# Patient Record
Sex: FEMALE | Race: WHITE
[De-identification: names, ages, dates, MRNs, and addresses within clinical notes are randomized per-mention and may not be internally consistent; named-entity substitution may affect disease eponyms.]

---

## 2021-07-07 NOTE — HISTORY AND PHYSICAL
DATE OF SERVICE:  



DATE OF ADMISSION:

07/14/2021.



ATTENDING PRIMARY CLINICIAN:

REANNA De León



HISTORY OF PRESENT ILLNESS:

The patient is a 61-year-old female with multiple gastrointestinal symptoms. 

She reports that she has had epigastric pain and reflux for many years.  She

also reports a history of having what she states is ulcers.  She is currently

not on any acid reduction medications.  She states that she also has developed

abdominal bloating and distention as well as nausea and vomiting usually after

eating meals, especially ones that are high in fat.  She has not had a

colonoscopy up to this point in her life as well.  She also reports that spicy

foods as well as acidic foods including red sauces make her epigastric pain

worse as well.



PAST MEDICAL HISTORY:

Peptic ulcer disease, hypercholesterolemia, hypothyroid, psoriatic arthritis,

Hashimoto's thyroiditis.



PAST SURGICAL HISTORY:

Hysterectomy in 1983, total hysterectomy in 1994, total thyroidectomy in 2019.



ALLERGIES:

PHENOBARBITAL.



MEDICATIONS:

Levothyroxine, atorvastatin.



SOCIAL HISTORY:

Positive smoke 40-pack years.  Negative alcohol.



FAMILY HISTORY:

Sister with stroke in her early 40s.



VITAL SIGNS:  Blood pressure 120/80, current weight 122.1 pounds, height 4 feet

11 inches.



REVIEW OF SYSTEMS:

Well-nourished female, in no acute distress.  She is not experiencing any

shortness of breath or difficulty breathing.  No chest pain, palpitations,

diaphoresis.  Intermittent episodes of epigastric pain, abdominal bloating as

well as intermittent nausea and vomiting.  No hematemesis, no coffee ground

emesis.  She also has intermittent episodes of diarrhea, usually after meals. 

No red blood per rectum, no dark tarry stools.  No fever, chills, no recent

inadvertent weight loss.



PHYSICAL EXAMINATION:

CHEST:  Clear.  Good breath sounds bilaterally.

HEART:  Regular, no murmurs.

EXTREMITIES:  No lower extremity edema, negative Homans sign.

HEENT:  No scleral icterus.

NECK:  No cervical lymphadenopathy.

ABDOMEN:  Soft, nondistended.  There is pain in the epigastric region on deep

palpation as well as a right upper abdominal quadrant.  No peritoneal signs.  No

hernias.

SKIN:  Warm, dry.



ASSESSMENT AND PLAN:

A 61-year-old female with a reflux esophagitis, history of peptic ulcer disease,

intermittent episodes of diarrhea as well as abdominal bloating, distention and

nausea and vomiting following meals.  She likely has some component of reflux

esophagitis as well as gastritis and we will schedule an EGD.  However, she is

also in need of a colonoscopy for she has not had one up to this point in her

life.  We will also proceed with a workup of her gallbladder with an ultrasound

and if this is negative, we will then proceed with a HIDA scan to evaluate for

the possibility of symptomatic biliary dyskinesia.





Job ID: [id number]

DocumentID: [id number]

Dictated Date:  07/06/2021 17:15:49

Transcription Date: 07/06/2021 17:39:20

Dictated By: KLEVER SALGADO MD

## 2021-07-09 ENCOUNTER — HOSPITAL ENCOUNTER (OUTPATIENT)
Dept: HOSPITAL 75 - RAD | Age: 61
End: 2021-07-09
Attending: SURGERY
Payer: COMMERCIAL

## 2021-07-09 DIAGNOSIS — R10.11: Primary | ICD-10-CM

## 2021-07-09 DIAGNOSIS — R10.13: ICD-10-CM

## 2021-07-09 DIAGNOSIS — R11.2: ICD-10-CM

## 2021-07-09 PROCEDURE — 76705 ECHO EXAM OF ABDOMEN: CPT

## 2021-07-09 NOTE — DIAGNOSTIC IMAGING REPORT
PROCEDURE: US Gallbladder.



TECHNIQUE: Multiple real-time grayscale images were obtained over

the right upper quadrant in various projections.



INDICATION:  Epigastric pain, nausea and vomiting.



FINDINGS: The liver is normal in size without focal lesions. No

biliary duct dilatation.  Common bile duct measures 3 mm. There

is no cholelithiasis, gallbladder wall thickening or

pericholecystic fluid. Pancreas is unremarkable. Aorta is

nonaneurysmal. IVC is patent. Right kidney is normal. No ascites.



IMPRESSION: Unremarkable right quadrant ultrasound.



Dictated by: 



  Dictated on workstation # [id number]

## 2021-07-12 ENCOUNTER — HOSPITAL ENCOUNTER (OUTPATIENT)
Dept: HOSPITAL 75 - PREOP | Age: 61
Discharge: HOME | End: 2021-07-12
Attending: SURGERY
Payer: COMMERCIAL

## 2021-07-12 VITALS — WEIGHT: 122.14 LBS | HEIGHT: 59.02 IN | BODY MASS INDEX: 24.62 KG/M2

## 2021-07-12 DIAGNOSIS — Z20.822: ICD-10-CM

## 2021-07-12 DIAGNOSIS — K21.9: ICD-10-CM

## 2021-07-12 DIAGNOSIS — Z12.11: ICD-10-CM

## 2021-07-12 DIAGNOSIS — Z01.812: Primary | ICD-10-CM

## 2021-07-12 PROCEDURE — 87635 SARS-COV-2 COVID-19 AMP PRB: CPT

## 2021-07-14 ENCOUNTER — HOSPITAL ENCOUNTER (OUTPATIENT)
Dept: HOSPITAL 75 - ENDO | Age: 61
End: 2021-07-14
Attending: SURGERY
Payer: COMMERCIAL

## 2021-07-14 VITALS — SYSTOLIC BLOOD PRESSURE: 139 MMHG | DIASTOLIC BLOOD PRESSURE: 73 MMHG

## 2021-07-14 VITALS — SYSTOLIC BLOOD PRESSURE: 114 MMHG | DIASTOLIC BLOOD PRESSURE: 55 MMHG

## 2021-07-14 VITALS — DIASTOLIC BLOOD PRESSURE: 67 MMHG | SYSTOLIC BLOOD PRESSURE: 148 MMHG

## 2021-07-14 VITALS — SYSTOLIC BLOOD PRESSURE: 143 MMHG | DIASTOLIC BLOOD PRESSURE: 81 MMHG

## 2021-07-14 VITALS — HEIGHT: 59.02 IN | BODY MASS INDEX: 24.62 KG/M2 | WEIGHT: 122.14 LBS

## 2021-07-14 VITALS — SYSTOLIC BLOOD PRESSURE: 123 MMHG | DIASTOLIC BLOOD PRESSURE: 56 MMHG

## 2021-07-14 VITALS — DIASTOLIC BLOOD PRESSURE: 66 MMHG | SYSTOLIC BLOOD PRESSURE: 138 MMHG

## 2021-07-14 VITALS — SYSTOLIC BLOOD PRESSURE: 91 MMHG | DIASTOLIC BLOOD PRESSURE: 54 MMHG

## 2021-07-14 VITALS — SYSTOLIC BLOOD PRESSURE: 97 MMHG | DIASTOLIC BLOOD PRESSURE: 52 MMHG

## 2021-07-14 VITALS — DIASTOLIC BLOOD PRESSURE: 53 MMHG | SYSTOLIC BLOOD PRESSURE: 89 MMHG

## 2021-07-14 VITALS — DIASTOLIC BLOOD PRESSURE: 79 MMHG | SYSTOLIC BLOOD PRESSURE: 158 MMHG

## 2021-07-14 VITALS — SYSTOLIC BLOOD PRESSURE: 105 MMHG | DIASTOLIC BLOOD PRESSURE: 70 MMHG

## 2021-07-14 VITALS — SYSTOLIC BLOOD PRESSURE: 175 MMHG | DIASTOLIC BLOOD PRESSURE: 78 MMHG

## 2021-07-14 DIAGNOSIS — K64.1: ICD-10-CM

## 2021-07-14 DIAGNOSIS — Z79.890: ICD-10-CM

## 2021-07-14 DIAGNOSIS — Z87.11: ICD-10-CM

## 2021-07-14 DIAGNOSIS — Z79.899: ICD-10-CM

## 2021-07-14 DIAGNOSIS — L40.50: ICD-10-CM

## 2021-07-14 DIAGNOSIS — E78.00: ICD-10-CM

## 2021-07-14 DIAGNOSIS — K29.70: ICD-10-CM

## 2021-07-14 DIAGNOSIS — E03.9: ICD-10-CM

## 2021-07-14 DIAGNOSIS — K21.00: ICD-10-CM

## 2021-07-14 DIAGNOSIS — Z12.11: Primary | ICD-10-CM

## 2021-07-14 DIAGNOSIS — E06.3: ICD-10-CM

## 2021-07-14 DIAGNOSIS — K44.9: ICD-10-CM

## 2021-07-14 PROCEDURE — 88305 TISSUE EXAM BY PATHOLOGIST: CPT

## 2021-07-14 NOTE — PROGRESS NOTE-POST OPERATIVE
Post-Operative Progess Note


Surgeon (s)/Assistant (s)


Surgeon


KLEVER SALGADO MD


Assistant:  none





Pre-Operative Diagnosis


GERD, screening colo





Post-Operative Diagnosis





reflux esophagitis(stage 2), small HH(2cm), moderate gastritis. 


mild chronic stage 2 ext and int hemorrhoids.





Procedure & Operative Findings


Date of Procedure


7/14/21


Procedure Performed/Findings


EGD with bx. 


colonoscopy


Anesthesia Type


cs





Estimated Blood Loss


Estimated blood loss (mL):  minimal





Specimens/Packing


Specimens Removed


ge jxn, antrum











KLEVER SALGADO MD                Jul 14, 2021 12:01

## 2021-07-14 NOTE — PROGRESS NOTE-PRE OPERATIVE
Pre-Operative Progress Note


H&P Reviewed


The H&P was reviewed, patient examined and no changes noted.


Date Seen by Provider:  Jul 14, 2021


Time Seen by Provider:  10:00


Date H&P Reviewed:  Jul 14, 2021


Time H&P Reviewed:  10:00


Pre-Operative Diagnosis:  GERD, screening colo











KLEVER SALGADO MD                Jul 14, 2021 10:15

## 2021-07-14 NOTE — DISCHARGE INST-SURGICAL
D/C Lap Instructions-KIDO


New, Converted, or Re-Newed RX:  RX on Chart


Follow Up 





Activity as tolerated





High Fiber Diet 25g or more per day





Avoid Alcohol, Caffeine, Spicy Greasy and Acid foods.





Drink 64 fluid oz or more of fluids per day.





Symptoms to Report: Fever over 101 degree F, Nausea/Vomiting 


If any problems/questions: Contact your physician or go to Emergency Room











KLEVER SALGADO MD                Jul 14, 2021 10:17

## 2021-07-14 NOTE — OPERATIVE REPORT
DATE OF SERVICE:  07/14/2021



ATTENDING PRIMARY CARE CLINICIAN:

REANNA De León



PREOPERATIVE DIAGNOSES:

Gastroesophageal reflux disease, abdominal bloating and distention, screening

colonoscopy.



POSTOPERATIVE DIAGNOSES:

Reflux esophagitis stage II, small hiatal hernia approximately 2 cm in size,

moderate gastritis.  Chronic stage II external and internal hemorrhoids. 

Remainder of the colon and rectum were normal.



PROCEDURE:

EGD with biopsy, colonoscopy.



SURGEON:

Klever Salgado MD



ANESTHESIA:

Conscious sedation.



ESTIMATED BLOOD LOSS:

Minimal.



FINDINGS:

Reflux esophagitis stage II, small hiatal hernia approximately 2 cm in size,

moderate gastritis.  Chronic stage II external and internal hemorrhoids. 

Remainder of the colon and rectum were normal.



DISPOSITION:

The patient tolerated the procedure well.



INDICATIONS:

The patient is a 61-year-old female who has had multiple gastrointestinal

symptoms.  She reports epigastric pain and reflux for many years; however, this

has worsened and she also develops that she has had gastric ulcers in the past. 

She is currently not on any acid reducers.  She also has developed abdominal

bloating, distention as well as nausea and vomiting usually after meals,

especially ones that are high in fat content.  She has also not had a

colonoscopy up to this point in her life.  She does not report any red blood per

rectum nor any dark tarry stools as well as no family history of colon cancer.



DESCRIPTION OF PROCEDURE:

The patient was brought to the endoscopy suite, laid in the left lateral

decubitus position with head slightly elevated.  After adequate IV pain,

anesthetic medications and conscious sedation anesthesia, the mouthpiece was

applied.



The endoscope was placed in the mouth, visualized the pharynx and hypopharyngeal

region.  Vocal cords, epiglottis and vallecula identified and appeared to be

normal.  The endoscope was then gently intubated.  The esophageal opening and

esophagus insufflated.  The endoscope was then advanced through the first,

second and third portion of esophagus at the level of the GE junction, reflux

esophagitis stage II identified.  There were no ulcers or strictures identified

in this region.  A biopsy was taken of the GE junction with forceps with

visualization of good hemostasis.



The endoscope was then advanced in the stomach and endoscope retroflexed,

visualizing a small hiatal hernia approximately 2 cm in size.  There was a

moderate severity gastritis more towards the stomach antrum.  No formal

ulcerations, polyps, or any neoplasms.  A biopsy was taken of the antrum to rule

out H. pylori with visualization of good hemostasis.  The endoscope was then

advanced to the pylorus and the first and second portion of the duodenum, which

appeared normal.  No distal obstructions.  The endoscope was then slowly

withdrawn while taking a second look and suctioning of residual air with no

additional findings.



A digital rectal examination was performed, which revealed chronic stage II

external and internal hemorrhoids, not actively edematous nor inflamed and no

bleeding.  Normal sphincter tone was felt and there were no palpable masses.



The endoscope was then intubated and anus and rectum gently insufflated.  The

endoscope was then advanced to the valves of Gonzalez of the rectum with no

polyps or any neoplasms identified.  We then proceeded through the sigmoid colon

where no diverticulosis identified.  We then advanced through the descending,

transverse and ascending colon to the cecum, which were normal.  There were no

polyps or any neoplasms identified throughout the colon or rectum.  The

endoscope was then slowly withdrawn while taking a second look and suctioning of

residual air with no additional findings.



The patient tolerated the procedure well.  For her reflux esophagitis, hiatal

hernia and gastritis, we will start her on Protonix 40 mg daily.  She will also

need to proceed with the necessary lifestyle and diet accommodation including

smoking cessation as well as avoidance of caffeinated beverages, spicy, greasy

and acidic foods.  We will also start her on Protonix 40 mg daily.  We will also

recommend a high fiber diet with at least 25 grams daily to promote soft stools

on a daily basis.  However, if she is asymptomatic from a lower gastrointestinal

standpoint, she does not need another colonoscopy for another 10 years.  We feel

that she potentially could have a gallbladder issue and we will have her follow

up in the office in 2 weeks to schedule an outpatient ultrasound as well as a

possible HIDA scan for a gallbladder etiology.





Job ID: [id number]

DocumentID: [id number]

Dictated Date:  07/14/2021 11:50:56

Transcription Date: 07/14/2021 18:22:39

Dictated By: KLEVER SALGADO MD

## 2021-07-14 NOTE — CONSCIOUS SEDATION/ASA
Conscious Sedation Pre-Proced


Time


10:00





ASA Score


2


For ASA 3 and 4: Consider anesthesia and medical clearance. Also, for patients

with a history of failed moderate sedation consider anesthesia.

















Airway 


 


Lungs 


 


Heart 


 


 ASA score


 


 ASA 1: a normal healthy patient


 


 ASA 2:  a patient with a mild systemic disease (mid diabetes, controlled 

hypertension, obesity 


 


 ASA 3:  a patient with a severe systemic disease that limits activity  (angina,

COPD, prior Myocardial infarction)


 


 ASA 4:  a patient with an incapacitating disease that is a constant threat to 

life (CHF, renal failure)


 


 ASA 5:  a moribund patient not expected to survive 24 hrs.  (ruptured aneurysm)


 


 ASA 6:  a declared brain-dead patient whose organs are being harvested.


 


 For emergent operations, add the letter E after the classification











Mallampati Classification


Grade 2





Sedation Plan


Analgesia, Amnesia, Plan communicated to team members, Discussed options with 

patient/fam, Discussed risks with patient/fam


The patient is an appropriate candidate to undergo the planned procedure, 

sedation, and anesthesia.





The patient immediately re-assessed prior to indication.











KLEVER SALGADO MD                Jul 14, 2021 10:15

## 2021-08-19 ENCOUNTER — HOSPITAL ENCOUNTER (OUTPATIENT)
Dept: HOSPITAL 75 - CARD | Age: 61
End: 2021-08-19
Attending: SURGERY
Payer: COMMERCIAL

## 2021-08-19 DIAGNOSIS — R11.2: ICD-10-CM

## 2021-08-19 DIAGNOSIS — R10.11: Primary | ICD-10-CM

## 2021-08-19 PROCEDURE — 78227 HEPATOBIL SYST IMAGE W/DRUG: CPT

## 2021-08-19 NOTE — DIAGNOSTIC IMAGING REPORT
INDICATION: Right upper quadrant pain.



The patient was administered 4.0 mCi technetium 99m Choletec

intravenously and imaging over the abdomen was performed. After

60 minutes, the patient ingested Ensure and a gallbladder

ejection fraction was calculated.



There is homogeneous uptake of activity by the liver with prompt

excretion of activity into the gallbladder and common duct. There

is normal passage of activity into the small bowel. Gallbladder

ejection fraction is 88%.



IMPRESSION: Normal HIDA scan and gallbladder ejection fraction.



Dictated by: 



  Dictated on workstation # [id number]

## 2021-08-26 ENCOUNTER — HOSPITAL ENCOUNTER (OUTPATIENT)
Dept: HOSPITAL 75 - PREOP | Age: 61
End: 2021-08-26
Attending: SURGERY
Payer: COMMERCIAL

## 2021-08-26 VITALS — WEIGHT: 125.22 LBS | HEIGHT: 59.02 IN | BODY MASS INDEX: 25.24 KG/M2

## 2021-08-26 DIAGNOSIS — Z01.818: Primary | ICD-10-CM

## 2021-09-02 ENCOUNTER — HOSPITAL ENCOUNTER (OUTPATIENT)
Dept: HOSPITAL 75 - SDC | Age: 61
Discharge: HOME | End: 2021-09-02
Attending: SURGERY
Payer: COMMERCIAL

## 2021-09-02 VITALS — DIASTOLIC BLOOD PRESSURE: 65 MMHG | SYSTOLIC BLOOD PRESSURE: 137 MMHG

## 2021-09-02 VITALS — HEIGHT: 58.66 IN | BODY MASS INDEX: 25.58 KG/M2 | WEIGHT: 125.22 LBS

## 2021-09-02 VITALS — SYSTOLIC BLOOD PRESSURE: 137 MMHG | DIASTOLIC BLOOD PRESSURE: 72 MMHG

## 2021-09-02 VITALS — DIASTOLIC BLOOD PRESSURE: 84 MMHG | SYSTOLIC BLOOD PRESSURE: 160 MMHG

## 2021-09-02 VITALS — DIASTOLIC BLOOD PRESSURE: 77 MMHG | SYSTOLIC BLOOD PRESSURE: 139 MMHG

## 2021-09-02 VITALS — SYSTOLIC BLOOD PRESSURE: 161 MMHG | DIASTOLIC BLOOD PRESSURE: 85 MMHG

## 2021-09-02 VITALS — SYSTOLIC BLOOD PRESSURE: 135 MMHG | DIASTOLIC BLOOD PRESSURE: 80 MMHG

## 2021-09-02 DIAGNOSIS — K80.10: Primary | ICD-10-CM

## 2021-09-02 DIAGNOSIS — Z79.890: ICD-10-CM

## 2021-09-02 DIAGNOSIS — K21.9: ICD-10-CM

## 2021-09-02 DIAGNOSIS — E03.9: ICD-10-CM

## 2021-09-02 DIAGNOSIS — I10: ICD-10-CM

## 2021-09-02 DIAGNOSIS — Z79.899: ICD-10-CM

## 2021-09-02 DIAGNOSIS — E78.00: ICD-10-CM

## 2021-09-02 DIAGNOSIS — K82.8: ICD-10-CM

## 2021-09-02 DIAGNOSIS — E06.3: ICD-10-CM

## 2021-09-02 DIAGNOSIS — K27.9: ICD-10-CM

## 2021-09-02 DIAGNOSIS — F17.210: ICD-10-CM

## 2021-09-02 DIAGNOSIS — L40.50: ICD-10-CM

## 2021-09-02 PROCEDURE — 87081 CULTURE SCREEN ONLY: CPT

## 2021-09-02 RX ADMIN — SODIUM CHLORIDE, SODIUM LACTATE, POTASSIUM CHLORIDE, AND CALCIUM CHLORIDE PRN MLS/HR: 600; 310; 30; 20 INJECTION, SOLUTION INTRAVENOUS at 10:29

## 2021-09-02 RX ADMIN — SODIUM CHLORIDE, SODIUM LACTATE, POTASSIUM CHLORIDE, AND CALCIUM CHLORIDE PRN MLS/HR: 600; 310; 30; 20 INJECTION, SOLUTION INTRAVENOUS at 12:05

## 2021-09-02 RX ADMIN — FENTANYL CITRATE ONE MCG: 50 INJECTION, SOLUTION INTRAMUSCULAR; INTRAVENOUS at 12:51

## 2021-09-02 RX ADMIN — FENTANYL CITRATE ONE MCG: 50 INJECTION, SOLUTION INTRAMUSCULAR; INTRAVENOUS at 12:49

## 2021-09-02 NOTE — DISCHARGE INST-SURGICAL
D/C Lap Instructions-KIDO


Reconcile Patient Problems


Problems Reviewed?:  Yes


New, Converted, or Re-Newed RX:  RX on Chart


Follow Up Appt in 2 weeks





Activity as tolerated


No driving for 24 hours


No driving while on pain medications





Incentive Spirometry use every 2 hours while awake





Regular Diet





Symptoms to Report: Fever over 101 degree F, Nausea/Vomiting 


Infection Signs and Symptoms to report:  Increased redness, Foul odor of wound, 

Increased drainage





Bathing instructions: May shower


Operative Area Clean/Dry;  Keep incision clean/dry





If any problems/questions: Contact your physician or go to Emergency Room











KD LANGLEY           Sep 2, 2021 09:48

## 2021-09-02 NOTE — PROGRESS NOTE-POST OPERATIVE
Post-Operative Progess Note


Surgeon (s)/Assistant (s)


Surgeon


KLEVER SALGADO MD


Assistant:  kole chaves APRN





Pre-Operative Diagnosis


Symptomatic biliary dyskinesia





Post-Operative Diagnosis





chronic calculous cholecystitis





Procedure & Operative Findings


Date of Procedure


9/2/21


Procedure Performed/Findings


laparoscopic cholecystectomy


Anesthesia Type


get





Estimated Blood Loss


Estimated blood loss (mL):  minimal





Specimens/Packing


Specimens Removed


gallbladder











KLEVER SALGADO MD                 Sep 2, 2021 12:26

## 2021-09-02 NOTE — XMS REPORT
Clinical Summary

                             Created on: 2021



Lovely Adorno

External Reference #: [id number]

: 1960

Sex: Female



Demographics





                          Address                   [address] [place]  [address]

 

                          Home Phone                [phone]

 

                          Preferred Language        English

 

                          Marital Status            

 

                          M[employer] Affiliation     [id number]

 

                          Race                      White

 

                          Ethnic Group              Not  or 





Author





                          Author                    [facility]

 

                          Organization              [facility]

 

                          Address                   Unknown

 

                          Phone                     Unavailable







Support





                Name            Relationship    Address         Phone

 

                Venkatesh Adorno     ECON            Unknown         [phone]







Care Team Providers





                    Care Team Member Name Role                Phone

 

                    Mayra Tellez MD   PCP                 [phone]







Source Comments

Some departments are not documenting in the electronic medical record.  If you d
o not see the information that you expected, contact Release of Information in [facility] Health Information Management department at [phone] for further assistan
ce in locating additional records.[facility]



Allergies





                                        Comments



                 Active Allergy  Reactions       Severity        Noted Date 

 

                                         



                 Fentanyl        UNKNOWN         Low             2021 

 

                                         



                 Methotrexate    UNKNOWN         Low             2021 

 

                                         



                 Phenobarbital   RASH, ITCHING   Medium          2021 

 

                                         



                 Sulfasalazine   HIVES           Medium          2021 







Medications





                          End Date                  Status



              Medication   Sig          Dispensed    Refills      Start  



                                         Date  

 

                                                    Active



                     atorvastatin (LIPITOR) 40  Take 40 mg by       0   



                           mg tablet                 mouth daily.     

 

                                                    Active



                     HYDROcodone/acetaminophen  Take 0.5            0   



                           (NORCO) 5/325 mg tablet   tablets by     



                                         mouth as     



                                         Needed (every     



                                         few days,     



                                         mostly at     



                                         night)     

 

                                                    Active



                     levothyroxine (SYNTHROID)  Take 200 mcg        0   



                           200 mcg tablet            by mouth     



                                         daily 30     



                                         minutes     



                                         before     



                                         breakfast.     

 

                                                    Active



                     pantoprazole DR     Take 40 mg by       0   



                           (PROTONIX) 40 mg tablet   mouth as     



                                         Needed.     

 

                                                    Active



                     calcium carb/vitamin  Take 2              0   



                           D3/vit K1 (VIACTIV PO)    tablets by     



                                         mouth daily.     







Active Problems





 



                           Problem                   Noted Date

 

 



                           Drug allergy-intolerance (phenobarbital, sulfasalazin

e, fentanyl,  2021



                                         methotrexate) 

 

 



                           Psoriasis, says patient   2021

 

 



                           Psoriatic arthritis, says patient  2021

 

 



                           PUD (peptic ulcer disease)  2021

 

 



                           Hypercholesterolemia      2021

 

 



                           Hypothyroidism, postsurgical  2021







Surgical History





   



                 Surgery         Date            Site/Laterality  Comments

 

   



                                         HX HYSTERECTOMY   

 

   



                           CERVIX LESION DESTRUCTION  1980 -  



                                         1980  

 

   



                           THYROIDECTOMY             2019 -  



                                         2019  







Medical History





  



                     Medical History     Date                Comments

 

  



                                         Thyroid disease  

 

  



                                         Arthritis  

 

  



                                         H/O herpes zoster virus  

 

  



                                         Psoriatic arthritis (HCC)  

 

  



                                         Peripheral neuropathy  

 

  



                                         Prolapse of vaginal wall  

 

  



                                         Hyperlipidemia  

 

  



                                         Fatigue  

 

  



                                         Joint swelling  

 

  



                                         Back pain  







Family History





   



                 Medical History  Relation        Name            Comments

 

   



                           Heart Disease             Father  

 

   



                           Hyperlipidemia            Father  

 

   



                           Scleroderma               Sister  







   



                 Relation        Name            Status          Comments

 

   



                           Father                     

 

   



                           Mother                    Alive 

 

   



                           Sister                    Alive 







Social History





                                        Date



                 Tobacco Use     Types           Packs/Day       Years Used 

 

                                         



                                         Current Every Day Smoker    

 

    



                                         Smokeless Tobacco: Never   



                                         Used   







                                        Comments



                           Alcohol Use               Standard Drinks/Week 

 

                                         



                           Never                     0 (1 standard drink = 0.6 o

z pure alcohol) 







  



                     Alcohol Habits      Answer              Date Recorded

 

  



                     How often do you have a drink containing alcohol?  Never   

            2021

 

  



                           How many drinks containing alcohol do you have on  No

t asked 



                                         a typical day when you are drinking?  

 

  



                           How often do you have six or more drinks on one  Not 

asked 



                                         occasion?  

 

  



                           Comment:                  Not asked 







 



                           Sex Assigned at Birth     Date Recorded

 

 



                                         Not on file 







Last Filed Vital Signs

Not on file



Plan of Treatment





   



                 Health Maintenance  Due Date        Last Done       Comments

 

   



                           HIV SCREENING             1973  

 

   



                           DTAP/TDAP VACCINES (1 -   1976  



                                         Tdap)   

 

   



                           HEPATITIS C SCREENING     1976  

 

   



                           PHYSICAL (COMPREHENSIVE)  1976  



                                         EXAM   

 

   



                           CERVICAL CANCER SCREENING  1979  

 

   



                           BREAST CANCER SCREENING   1998  

 

   



                           COLORECTAL CANCER         2008  



                                         SCREENING   

 

   



                           SHINGLES RECOMBINANT      2008  



                                         VACCINE (1 of 2)   

 

   



                           INFLUENZA VACCINE         10/01/2021  







Results

Not on filefrom Last 3 Months



Advance Directives





                          Patient Representative    Explanation



                           Type                      Date Recorded  

 

                                                     



                                         Advance   



                                         Directive/DPOA

## 2021-09-02 NOTE — OPERATIVE REPORT
DATE OF SERVICE:  09/02/2021



ATTENDING PRIMARY CARE CLINICIAN:

REANNA De León.



PREOPERATIVE DIAGNOSIS:

Symptomatic biliary dyskinesia.



POSTOPERATIVE DIAGNOSIS:

Chronic calculous cholecystitis.



PROCEDURE PERFORMED:

Laparoscopic cholecystectomy.



SURGEON:

Klever Salgado MD.



ASSISTANT:

REANNA Lopez.



ANESTHESIA:

General endotracheal.



ESTIMATED BLOOD LOSS:

Minimal.



FINDINGS:

Multiple small gallstones.  No gallbladder wall thickening.



DISPOSITION:

The patient tolerated the procedure well.



INDICATIONS FOR PROCEDURE:

The patient is a 61-year-old female with multiple gastrointestinal symptoms. 

She reports epigastric pain and reflux for many years and this had worsened.  On

07/14/2021, she underwent an EGD and colonoscopy and was found to have a reflux

esophagitis stage II, hiatal hernia 2 cm in size, and moderate gastritis as well

as hemorrhoids.  She reports that she has had issues with nausea and abdominal

bloating usually after eating meals.  She initially underwent an ultrasound,

which was unremarkable and then this was followed by a HIDA scan, which showed a

normal ejection fraction; however, during the administration of Kinevac

analogue, she did have reproduction of symptoms with nausea and abdominal

bloating.



DESCRIPTION OF PROCEDURE:

The patient was brought to the operating room and laid supine on the table. 

After adequate IV pain and sedative medications and general endotracheal

intubation, the abdomen was prepped and draped in a standard surgical fashion.



A 0.5% Marcaine with epinephrine was then used to anesthetize the overlying skin

in the left upper abdominal quadrant and a transverse skin incision was made

using a 15 blade.  A 0 silk suture was applied to the medial aspect of the

incision for retraction and a Veress needle inserted with a low opening pressure

of 0 mmHg.  The abdomen was then insufflated to 15 mmHg pressure.  The Veress

needle was removed and a 5 mm XL trocar was placed followed by a 5 mm 45-degree

angle laparoscope visualizing the peritoneal cavity.



A four-quadrant abdominal exploration was performed.  There was a mild

gallbladder wall distention, no gallbladder wall thickening.  Under direct

visualization, we then proceeded to place a supraumbilical 10 mm port after the

skin and peritoneal lining were anesthetized using 0.5% Marcaine with

epinephrine and a transverse skin incision was made using 15 blade.  In a

similar manner, a right upper abdominal quadrant 5 mm port was placed.



The patient was then placed in a reverse Trendelenburg position as well as plane

right side up, left side down.  The fundus of the gallbladder was then retracted

anteriorly and superiorly.  The hepatoduodenal ligament was then dissected open

using a blunt dissection as well as electrocautery on the hook instrument as

well as a [place] dissector.  The entire critical view of safety was identified

including the triangle of Calot as well as the cystic duct and artery as the

only two structures going into the gallbladder as well as the cystic plate

behind the proximal gallbladder.  A timeout was then taken and the cystic duct

and artery were then clipped proximally and distally and cut with EndoShears. 

The gallbladder was then dissected off the liver bed using electrocautery and

the hook instrument with visualization of good hemostasis as well as no leaking

ducts of Luschka.  The gallbladder was removed through the 10 mm port site using

an EndoCatch bag.



The 10 mm port site fascia and peritoneum were then closed under direct

visualization using a Trace-Yaquelin device and 0 Vicryl suture.  The abdomen

was desufflated and remaining ports removed.  All skin incisions were closed

using 4-0 Monocryl running subcuticular sutures.  Wounds were then cleaned and

covered with Dermabond.



The patient tolerated the procedure well.  We will start IV normal pain

medication as well as a clear liquid diet.  When she is tolerating clears, has

good pain control with oral pain medications, and ambulating well, we will

discharge her home.  She will be instructed to do no heavy lifting or exertion

for the next two weeks.





Job ID: [id number]

DocumentID: [id number]

Dictated Date:  09/02/2021 12:27:41

Transcription Date: 09/02/2021 17:44:45

Dictated By: KLEVER SALGADO MD

## 2021-09-02 NOTE — PROGRESS NOTE-PRE OPERATIVE
Pre-Operative Progress Note


H&P Reviewed


The H&P was reviewed, patient examined and no changes noted.


Date Seen by Provider:  Sep 2, 2021


Time Seen by Provider:  09:45


Date H&P Reviewed:  Sep 2, 2021


Time H&P Reviewed:  09:40


Pre-Operative Diagnosis:  Symptomatic biliary dyskinesia











KD LANGLEY           Sep 2, 2021 09:45

## 2021-09-02 NOTE — ANESTHESIA-GENERAL POST-OP
General


Patient Condition


Mental Status/LOC:  Same as Preop


Cardiovascular:  Satisfactory


Nausea/Vomiting:  Absent


Respiratory:  Satisfactory


Pain:  Controlled


Complications:  Absent





Post Op Complications


Complications


None





Follow Up Care/Instructions


Patient Instructions


None needed.





Anesthesia/Patient Condition


Patient Condition


Patient is doing well, no complaints, stable vital signs, no apparent adverse 

anesthesia problems.   


No complications reported per nursing.


D/C home per [employer] [facility] [employer] Criteria:  Yes











CARYN ARRIETA CRNA            Sep 2, 2021 13:24

## 2023-05-04 ENCOUNTER — HOSPITAL ENCOUNTER (OUTPATIENT)
Dept: HOSPITAL 75 - PREOP | Age: 63
Discharge: HOME | End: 2023-05-04
Attending: SURGERY
Payer: MEDICARE

## 2023-05-04 VITALS — HEIGHT: 57.99 IN | BODY MASS INDEX: 26.52 KG/M2 | WEIGHT: 126.32 LBS

## 2023-05-04 DIAGNOSIS — Z01.818: Primary | ICD-10-CM

## 2023-05-05 NOTE — HISTORY AND PHYSICAL
DATE OF SERVICE: 05/10/2023



Date of admission will be 05/10/2023.



ATTENDING PRIMARY CLINICIAN:

[facility].



HISTORY OF PRESENT ILLNESS:

The patient is a 63-year-old female known to us.  She has had multiple 

gastrointestinal issues including epigastric pain and reflux as well as a 

history of peptic ulcer disease in the past.  At that time, she had reported 

that she had been taking over-the-counter acid reducers with not much relief.  

She was also in need of a colonoscopy and on 07/14/2021, she underwent EGD and 

colonoscopy.  Findings included a reflux esophagitis, Halsey grade B, 

hiatal hernia approximately 2 cm in size and moderate gastritis.  Biopsies were 

negative for Tucker's esophagus and H. pylori.  The patient continued to have 

symptoms, then we had undergone gallbladder testing including an ultrasound, 

which did not show any gallstones; however, HIDA scan was performed and it was 

in a normal range; however, during the Kinevac analog she did have reproduction 

of symptoms consistent with biliary dyskinesia.  This was performed in 09/2021.



She reports that over the past 2 years, she has had worsening epigastric pain, 

increased bloating as well as intermittent episodes of nausea and infrequent 

emesis; however, she has had frequent episodes of regurgitation, usually after 

eating a meal.  She does not report any hematemesis, no coffee-ground emesis.  

She was started on pantoprazole initially; however, did stop at some point.



PAST MEDICAL HISTORY:

Gastroesophageal reflux disease.  Hiatal hernia, peptic ulcer disease, 

hypercholesterolemia, hypothyroid, psoriatic arthritis, Hashimoto's thyroiditis,

fibromyalgia.



PAST SURGICAL HISTORY:

Partial hysterectomy 1983, completion nephrectomy 1994, tonsillectomy, total 

thyroidectomy 2019, laparoscopic cholecystectomy 09/2021.



ALLERGIES:

PHENOBARBITAL, SULFA.



MEDICATIONS:

Atorvastatin daily, levothyroxine daily.



SOCIAL HISTORY:

Positive smoke 40 pack years, negative alcohol.



FAMILY HISTORY:

Her sister with DVT and cerebrovascular accident.  Father, myocardial 

infarction.



VITAL SIGNS:  Blood pressure 130/60.  Current weight 126.1 pounds at 4 feet 10 

inches.



REVIEW OF SYSTEMS:

A well-nourished female, currently in no acute distress.  She is not 

experiencing any shortness of breath or difficulty breathing.  No chest pain, 

palpitations, diaphoresis.  No nausea, vomiting with intermittent episodes of 

regurgitation usually after eating meals and increased intra-abdominal pressure.

 No hematemesis, no coffee-ground emesis.  No known diarrhea, no constipation, 

no red blood per rectum, no dark tarry stools.  No fever, chills, no recent 

inadvertent weight loss.  All other review of systems negative.



PHYSICAL EXAMINATION:

CHEST:  Distant breath sounds and scattered wheezes bilaterally.

HEART:  Regular.  No murmurs.

EXTREMITIES:  No lower extremity edema.  Negative Homans sign.

HEENT:  No scleral icterus.  No cervical lymphadenopathy.

ABDOMEN:  Soft, nondistended.  There is pain in the epigastric region upon deep 

palpation.  No peritoneal signs.  No hernias.

SKIN:  Warm, dry.



ASSESSMENT AND PLAN:

A 63-year-old female with worsening gastroesophageal reflux type of symptoms 

with known history of hiatal hernia.  We will schedule her for followup EGD to 

reevaluate her upper gastrointestinal tract as well as to reevaluate the size of

the hiatal hernia.  We will also again proceed with biopsies for Tucker's 

esophagus as well as H pylori.



























Job ID: [id number]

DocumentID: [id number]

Dictated Date: 05/02/2023 15:33:28

Transcription Date: 05/02/2023 15:57:00

Dictated By: KELVER SALGADO MD

## 2023-05-24 ENCOUNTER — HOSPITAL ENCOUNTER (OUTPATIENT)
Dept: HOSPITAL 75 - ENDO | Age: 63
Discharge: HOME | End: 2023-05-24
Attending: SURGERY
Payer: MEDICARE

## 2023-05-24 VITALS — SYSTOLIC BLOOD PRESSURE: 140 MMHG | DIASTOLIC BLOOD PRESSURE: 86 MMHG

## 2023-05-24 VITALS — SYSTOLIC BLOOD PRESSURE: 129 MMHG | DIASTOLIC BLOOD PRESSURE: 82 MMHG

## 2023-05-24 VITALS — DIASTOLIC BLOOD PRESSURE: 82 MMHG | SYSTOLIC BLOOD PRESSURE: 129 MMHG

## 2023-05-24 VITALS — WEIGHT: 126.32 LBS | BODY MASS INDEX: 26.52 KG/M2 | HEIGHT: 57.99 IN

## 2023-05-24 VITALS — SYSTOLIC BLOOD PRESSURE: 99 MMHG | DIASTOLIC BLOOD PRESSURE: 54 MMHG

## 2023-05-24 VITALS — DIASTOLIC BLOOD PRESSURE: 50 MMHG | SYSTOLIC BLOOD PRESSURE: 88 MMHG

## 2023-05-24 DIAGNOSIS — F17.210: ICD-10-CM

## 2023-05-24 DIAGNOSIS — K31.89: ICD-10-CM

## 2023-05-24 DIAGNOSIS — K22.2: ICD-10-CM

## 2023-05-24 DIAGNOSIS — K44.9: ICD-10-CM

## 2023-05-24 DIAGNOSIS — K21.00: Primary | ICD-10-CM

## 2023-05-24 DIAGNOSIS — K29.70: ICD-10-CM

## 2023-05-24 DIAGNOSIS — Z87.11: ICD-10-CM

## 2023-05-24 NOTE — PROGRESS NOTE-POST OPERATIVE
Post-Operative Progess Note


Surgeon (s)/Assistant (s)


Surgeon


KLEVER SALGADO MD


Assistant:  none





Pre-Operative Diagnosis


GERD





Post-Operative Diagnosis





reflux esophagitis(grade B-C), mild dist esoph stricture, moderate


HH(2.5cm), mod gastritis.





Procedure & Operative Findings


Date of Procedure


5/24/23


Procedure Performed/Findings


EGD with bx


Anesthesia Type


get





Estimated Blood Loss


Estimated blood loss (mL):  minimal





Specimens/Packing


Specimens Removed


ge jxn, antrum











KLEVER SALGADO MD                May 24, 2023 12:33

## 2023-05-24 NOTE — OPERATIVE REPORT
DATE OF SERVICE: 05/24/2023



ATTENDING PRIMARY CARE CLINICIAN:

[facility].



PREOPERATIVE DIAGNOSES:

Gastroesophageal reflux disease, regurgitation.



POSTOPERATIVE DIAGNOSES:

Reflux esophagitis, [place] between grade B and C with a mild distal 

esophageal stricture, moderate size hiatal hernia approximately 2.5 cm in size. 

Moderate gastritis.



PROCEDURE:

EGD with biopsy and balloon dilatation.



SURGEON:

Klever Salgado MD



ANESTHESIA:

Monitored anesthesia care.



ESTIMATED BLOOD LOSS:

Minimal.



FINDINGS:

Reflux esophagitis, [place] between grade B and C with a mild distal 

esophageal stricture, moderate size hiatal hernia approximately 2.5 cm in size. 

Moderate gastritis.



DISPOSITION:

The patient tolerated the procedure well.



INDICATIONS:

The patient is a 63-year-old female known to us.  She has had multiple 

gastrointestinal issues including epigastric pain and reflux as well as a 

history of peptic ulcer disease; however, does have a major risk factor, which 

encompasses smoking for greater than 40 pack years.  She has tried a number of 

different over-the-counter acid reducers with not much relief.  On her last EGD,

she was found to have a hiatal hernia approximately 2 cm in size as well as a 

reflux esophagitis.  She was also found to have biliary dyskinesia and is status

post cholecystectomy on 09/2021.  This time around, she reports that she has 

been having worsening epigastric pain, bloating, intermittent episodes of nausea

as well as regurgitation and sometimes dysphagia.  She was on Protonix at some 

point; however, states that she stopped this at some point.



DESCRIPTION OF PROCEDURE:

The patient was brought to the endoscopy suite and laid in the left lateral 

decubitus position.  After adequate IV pain and sedative medications and 

monitored anesthesia care, the mouthpiece was applied.



The endoscope was placed in the mouth, visualizing the pharynx and 

hypopharyngeal region.  Vocal cords, epiglottis and vallecula identified and 

appeared to be normal.  The endoscope was then gently intubated in the 

esophageal opening and esophagus insufflated.  The endoscope was then advanced 

through the first, second, third portions of esophagus at the level of the GE 

junction, a reflux esophagitis [place] between grade B and C identified as 

well as a mild distal esophageal stricture.  Biopsy was taken with forceps with 

visualization of good hemostasis.  The GE junction was also intrathoracic 

consistent with a hiatal hernia.



The endoscope was then advanced into the stomach.  The endoscope retroflexed 

visualizing a small to moderate size hiatal hernia approximately 2.5-3 cm in 

size.  There was a moderate severity gastritis, more towards the stomach antrum.

 No formal ulcerations, polyps or any neoplasms.  A biopsy was taken of the 

antrum to rule out H. pylori with visualization of good hemostasis.  The 

endoscope was then advanced to the pylorus and the first and second portion of 

the duodenum with no distal obstructions.



The balloon was then placed in the stomach and pulled back to the area of the 

stricture.  We then proceeded with graded dilatation from 2, 4, then eventually 

6 atmospheres of pressure, 20 mm in luminal diameter with moderate resistance 

and left this in place for approximately 60 seconds.  The balloon was then 

desufflated and removed with visualization of good hemostasis as well as no 

mucosal tears.  The endoscope was then slowly withdrawn while taking a second 

look and suctioning of residual air with no additional findings.



The patient tolerated the procedure well.  She will need to proceed with the 

necessary lifestyle and dietary accommodation, which first and foremost 

encompassing smoking cessation; however, also things like avoidance of 

caffeinated beverages, spicy, greasy and acidic foods.  She also needs to take 

in small and more frequent meals and avoidance of eating at night.  We will also

start her on omeprazole 40 mg daily.





Job ID: [id number]

DocumentID: [id number]

Dictated Date: 05/24/2023 12:24:58

Transcription Date: 05/24/2023 18:45:00

Dictated By: KLEVER SALGADO MD

## 2023-05-24 NOTE — ANESTHESIA-GENERAL POST-OP
MAC


Patient Condition


Mental Status/LOC:  Same as Preop


Cardiovascular:  Satisfactory


Nausea/Vomiting:  Absent


Respiratory:  Satisfactory


Pain:  Controlled


Complications:  Absent





Post Op Complications


Complications


None





Follow Up Care/Instructions


Patient Instructions


None needed.





Anesthesiology Discharge Order


Discharge Order


Patient is awake and doing well, no complaints, stable vital signs, no apparent 

adverse anesthesia problems.   


No complications reported per nursing.











ESSENCE WITT DO         May 24, 2023 12:39

## 2023-05-24 NOTE — PROGRESS NOTE-PRE OPERATIVE
Pre-Operative Progress Note


Date of Available H&P:  May 24, 2023


Date H&P Reviewed:  May 24, 2023


Time H&P Reviewed:  10:30


History & Physical:  No changes noted


Pre-Operative Diagnosis:  GERD











KLEVER SALGADO MD                May 24, 2023 12:31

## 2023-05-24 NOTE — DISCHARGE INST-SURGICAL
D/C Lap Instructions-KIDO


New, Converted, or Re-Newed RX:  RX on Chart


Follow Up PRN





Activity as tolerated








High Fiber Diet 25g or more per day





Avoid Alcohol, Caffeine, Spicy Greasy and Acid foods.





Drink 64 fluid oz or more of fluids per day.





Symptoms to Report: Fever over 101 degree F, Nausea/Vomiting 


If any problems/questions: Contact your physician or go to Emergency Room











KLEVER SALGADO MD                May 24, 2023 12:34